# Patient Record
Sex: FEMALE | URBAN - METROPOLITAN AREA
[De-identification: names, ages, dates, MRNs, and addresses within clinical notes are randomized per-mention and may not be internally consistent; named-entity substitution may affect disease eponyms.]

---

## 2018-10-09 ENCOUNTER — OFFICE VISIT (OUTPATIENT)
Dept: URBAN - METROPOLITAN AREA CLINIC 81 | Facility: CLINIC | Age: 63
End: 2018-10-09
Payer: COMMERCIAL

## 2018-10-09 DIAGNOSIS — H25.13 AGE-RELATED NUCLEAR CATARACT, BILATERAL: Primary | ICD-10-CM

## 2018-10-09 DIAGNOSIS — H40.011 OPEN ANGLE WITH BORDERLINE FINDINGS, LOW RISK, RIGHT EYE: ICD-10-CM

## 2018-10-09 PROCEDURE — 92004 COMPRE OPH EXAM NEW PT 1/>: CPT | Performed by: OPTOMETRIST

## 2018-10-09 ASSESSMENT — VISUAL ACUITY
OD: 20/20
OS: 20/20

## 2018-10-09 ASSESSMENT — KERATOMETRY
OS: 42.50
OD: 43.25

## 2018-10-09 ASSESSMENT — INTRAOCULAR PRESSURE
OS: 16
OD: 16

## 2018-10-09 NOTE — IMPRESSION/PLAN
Impression: Open angle with borderline findings, low risk, right eye: H40.011. Plan: Discussed seriousness of condition of glc, Pt understands. Schedule 24-2 VF, ONH OCT & Pachometry. Recheck IOP's.

## 2018-10-09 NOTE — IMPRESSION/PLAN
Impression: Age-related nuclear cataract, bilateral: H25.13. No floaters/pvd seen today Plan: Pt understands that condition can be influencing BVA/Rx. Will continue to observe/monitor. UV protection. Pt understands.

## 2018-10-18 ENCOUNTER — OFFICE VISIT (OUTPATIENT)
Dept: URBAN - METROPOLITAN AREA CLINIC 81 | Facility: CLINIC | Age: 63
End: 2018-10-18
Payer: COMMERCIAL

## 2018-10-18 DIAGNOSIS — H40.013 OPEN ANGLE WITH BORDERLINE FINDINGS, LOW RISK, BILATERAL: ICD-10-CM

## 2018-10-18 PROCEDURE — 92083 EXTENDED VISUAL FIELD XM: CPT | Performed by: OPTOMETRIST

## 2018-10-18 PROCEDURE — 76514 ECHO EXAM OF EYE THICKNESS: CPT | Performed by: OPTOMETRIST

## 2018-10-18 PROCEDURE — 92133 CPTRZD OPH DX IMG PST SGM ON: CPT | Performed by: OPTOMETRIST

## 2018-10-18 ASSESSMENT — INTRAOCULAR PRESSURE
OD: 17
OS: 17

## 2018-10-18 NOTE — IMPRESSION/PLAN
Impression: Open angle with borderline findings, low risk, right eye: H40.011. Plan:  All baseline tests wnl

## 2020-07-28 ENCOUNTER — OFFICE VISIT (OUTPATIENT)
Dept: URBAN - METROPOLITAN AREA CLINIC 81 | Facility: CLINIC | Age: 65
End: 2020-07-28
Payer: COMMERCIAL

## 2020-07-28 DIAGNOSIS — H25.813 COMBINED FORMS OF AGE-RELATED CATARACT, BILATERAL: ICD-10-CM

## 2020-07-28 DIAGNOSIS — H52.4 PRESBYOPIA: Primary | ICD-10-CM

## 2020-07-28 PROCEDURE — 92014 COMPRE OPH EXAM EST PT 1/>: CPT | Performed by: OPTOMETRIST

## 2020-07-28 ASSESSMENT — VISUAL ACUITY
OS: 20/20
OD: 20/20

## 2020-07-28 ASSESSMENT — KERATOMETRY
OS: 43.13
OD: 42.75

## 2020-07-28 ASSESSMENT — INTRAOCULAR PRESSURE
OS: 18
OD: 18

## 2020-07-28 NOTE — IMPRESSION/PLAN
Impression: Open angle with borderline findings, low risk, bilateral: H40.013.

 - glaucoma suspect based on family Hx and mild ONH cupping - Low risk of progression at present time due to normotensive IOP, avg pachs, healthy appearing disc margins w/o apparent change over time Plan: Monitor

## 2023-05-16 ENCOUNTER — OFFICE VISIT (OUTPATIENT)
Dept: URBAN - METROPOLITAN AREA CLINIC 81 | Facility: CLINIC | Age: 68
End: 2023-05-16
Payer: COMMERCIAL

## 2023-05-16 DIAGNOSIS — H52.4 PRESBYOPIA: ICD-10-CM

## 2023-05-16 DIAGNOSIS — H04.123 DRY EYE SYNDROME OF BILATERAL LACRIMAL GLANDS: ICD-10-CM

## 2023-05-16 DIAGNOSIS — H40.013 OPEN ANGLE WITH BORDERLINE FINDINGS, LOW RISK, BILATERAL: ICD-10-CM

## 2023-05-16 DIAGNOSIS — H53.143 VISUAL DISCOMFORT, BILATERAL: ICD-10-CM

## 2023-05-16 DIAGNOSIS — H25.13 AGE-RELATED NUCLEAR CATARACT, BILATERAL: Primary | ICD-10-CM

## 2023-05-16 PROCEDURE — 99213 OFFICE O/P EST LOW 20 MIN: CPT | Performed by: OPTOMETRIST

## 2023-05-16 PROCEDURE — 92133 CPTRZD OPH DX IMG PST SGM ON: CPT | Performed by: OPTOMETRIST

## 2023-05-16 ASSESSMENT — KERATOMETRY
OD: 43.13
OS: 43.13

## 2023-05-16 ASSESSMENT — VISUAL ACUITY
OS: 20/20
OD: 20/20

## 2023-05-16 ASSESSMENT — INTRAOCULAR PRESSURE
OD: 17
OS: 17

## 2023-05-16 NOTE — IMPRESSION/PLAN
Impression: Visual discomfort, bilateral: H53.143. Plan: Discussed, recommend UV protection/sunglasses. Monitor.

## 2023-05-16 NOTE — IMPRESSION/PLAN
Impression: Open angle with borderline findings, low risk, bilateral: H40.013.
-suspect C/D OU
-IOP stable OU
-05.16.23 Ordered and performed RNFL OCT, normal RNFL OU, stable OU from previous Plan: The pathophysiology of glaucoma and the difference between being a glaucoma suspect and having glaucoma were discussed with patient. Continue to monitor without treatment at this time.

## 2023-05-16 NOTE — IMPRESSION/PLAN
Impression: Dry eye syndrome of bilateral lacrimal glands: C84.828. allergic overlay Plan: Discussed diagnosis in detail with patient. Dry eye accounts for the patient's symptoms. Dry eye is a chronic condition and does not have a cure and will need artificial tears for maintenance. Recommend Systane Complete or Refresh Relieva OU QID longterm. Monitor for changes. Dispensed samples of Lastacaft, and Refresh Relieva.